# Patient Record
(demographics unavailable — no encounter records)

---

## 2025-07-07 NOTE — HISTORY OF PRESENT ILLNESS
[de-identified] : 60 yo M who in met in [facility] when he had been admitted with abdominal pain. He had been c/o bleeding from his umbilical area and noted mesh sticking out of the skin at that time. He had had a repair in the distant past. He was taken to the OR and underwent mesh excision and primary repair of his hernia. His skin that had mesh exposed was left open. He expectedly developed some fluid in the area and went to the ED twice before I was called to come drain the seroma. He had VNS set up to pack the wound but came back to ED over the past weekend with recurrent pain. Again CT showed no pathology and improving seroma. He was discharged home and returns now for check of the wound. He has no pain today.

## 2025-07-07 NOTE — CONSULT LETTER
[Dear  ___] : Dear  [unfilled], [Courtesy Letter:] : I had the pleasure of seeing your patient, [unfilled], in my office today. [Please see my note below.] : Please see my note below. [Consult Closing:] : Thank you very much for allowing me to participate in the care of this patient.  If you have any questions, please do not hesitate to contact me. [Sincerely,] : Sincerely, [FreeTextEntry3] : Jacklyn Handy MD

## 2025-07-07 NOTE — PHYSICAL EXAM
[Respiratory Effort] : normal respiratory effort [Normal Rate and Rhythm] : normal rate and rhythm [No Rash or Lesion] : No rash or lesion [Calm] : calm [de-identified] : NAD, well-appearing  [de-identified] : soft, nondistended; no recurrence of hernia noted; small opening in skin at umbilical stump with serous drainage. Packing changed, Minimal erythema at incision site laterally resolving.

## 2025-07-07 NOTE — ASSESSMENT
[FreeTextEntry1] : 60 yo M with h/o mesh erosion to skin s/p excision of mesh and primary repair of hernia. Postop seroma expected and resolving without evidence of infection  Continue packing with packing strip by VNS Follow up 1 week. Continue binder while awake.

## 2025-07-14 NOTE — HISTORY OF PRESENT ILLNESS
[de-identified] : Patient returns for wound check. VNS has continued packing. He said he felt some pain on the right after straining for a BM recently. No new bulges. No fevers, chills.

## 2025-07-14 NOTE — ASSESSMENT
[FreeTextEntry1] : 60 yo M s/p hernia mesh explanation with resolving postop seroma  Advised to cover with alginate and gauze instead of packing Follow up 1 week Will monitor new tenderness. No evidence of hernia at this time.

## 2025-07-14 NOTE — PHYSICAL EXAM
[Respiratory Effort] : normal respiratory effort [Normal Rate and Rhythm] : normal rate and rhythm [No Rash or Lesion] : No rash or lesion [Calm] : calm [de-identified] : NAD [de-identified] : soft, nondistended, no palpable hernia noted though some tenderness over right of umbilicus. Bedside US without minimal remnant fluid in the sub cutaneous tissues; scant bloody drainage

## 2025-07-21 NOTE — PHYSICAL EXAM
[Respiratory Effort] : normal respiratory effort [Normal Rate and Rhythm] : normal rate and rhythm [de-identified] : NAD, well-appearing  [de-identified] : soft, tender around umbilical area without clear evidence of hernia recurrence. mild purulent drainage from tiny opening at umbilical base but on US, a pocket of fluid noted in area of concern; aspiration of pocket yielded purulent contents.

## 2025-07-21 NOTE — ASSESSMENT
[FreeTextEntry1] : 62 yo M with recurrent infection the subcutaneous space without clear evidence of deeper pathology. However, prior opening at umbilical stalk which we had left open and packed appears closed and infection is contained in the subcutaneous tissue without erythema overlying it. Upon attempt at aspiration after anesthetizing the area with 3 cc of lidocaine, patient had vasovagal syncope. He had stable vitals throughout but continue to have recurrent episodes of syncope. Given this, EMS was called to take patient to ED. Patient's wife was notified.

## 2025-07-21 NOTE — HISTORY OF PRESENT ILLNESS
[de-identified] : Patient returns for wound check. He reports new pain in the left and below the umbilicus. The drainage is minimal but smells foul. No fevers, chills or other symptoms.

## 2025-07-28 NOTE — HISTORY OF PRESENT ILLNESS
[de-identified] : Patient returns for follow up. He was recently readmitted and had washout of the subcutaneous infection with staph and pseudomonas. He is back to packing the wound with improved pain and no other symptoms at this point.

## 2025-07-28 NOTE — PHYSICAL EXAM
[Respiratory Effort] : normal respiratory effort [Normal Rate and Rhythm] : normal rate and rhythm [Calm] : calm [de-identified] : NAD, well-appearing  [de-identified] : anicteric  [de-identified] : soft, nondistended, cellulitis around i&d site resolved, wound repacked

## 2025-07-28 NOTE — ASSESSMENT
[FreeTextEntry1] : 60 yo M s/p wound infection after initial closure. Reopened and washed out last week now on abx. Doing well.  Continue packing wound every other day with iodoform and cover with gauze. F/u next week.